# Patient Record
Sex: FEMALE
[De-identification: names, ages, dates, MRNs, and addresses within clinical notes are randomized per-mention and may not be internally consistent; named-entity substitution may affect disease eponyms.]

---

## 2019-01-30 PROBLEM — Z00.00 ENCOUNTER FOR PREVENTIVE HEALTH EXAMINATION: Status: ACTIVE | Noted: 2019-01-30

## 2019-02-11 ENCOUNTER — APPOINTMENT (OUTPATIENT)
Dept: OBGYN | Facility: CLINIC | Age: 29
End: 2019-02-11
Payer: COMMERCIAL

## 2019-02-22 ENCOUNTER — APPOINTMENT (OUTPATIENT)
Dept: OBGYN | Facility: CLINIC | Age: 29
End: 2019-02-22
Payer: COMMERCIAL

## 2019-02-22 VITALS — DIASTOLIC BLOOD PRESSURE: 76 MMHG | SYSTOLIC BLOOD PRESSURE: 130 MMHG | WEIGHT: 148 LBS

## 2019-02-22 DIAGNOSIS — Z01.419 ENCOUNTER FOR GYNECOLOGICAL EXAMINATION (GENERAL) (ROUTINE) W/OUT ABNORMAL FINDINGS: ICD-10-CM

## 2019-02-22 DIAGNOSIS — N89.8 OTHER SPECIFIED NONINFLAMMATORY DISORDERS OF VAGINA: ICD-10-CM

## 2019-02-22 DIAGNOSIS — Z78.9 OTHER SPECIFIED HEALTH STATUS: ICD-10-CM

## 2019-02-22 DIAGNOSIS — Z30.011 ENCOUNTER FOR INITIAL PRESCRIPTION OF CONTRACEPTIVE PILLS: ICD-10-CM

## 2019-02-22 DIAGNOSIS — Z80.3 FAMILY HISTORY OF MALIGNANT NEOPLASM OF BREAST: ICD-10-CM

## 2019-02-22 PROCEDURE — 99385 PREV VISIT NEW AGE 18-39: CPT

## 2019-02-25 LAB
CANDIDA VAG CYTO: NOT DETECTED
G VAGINALIS+PREV SP MTYP VAG QL MICRO: NOT DETECTED
T VAGINALIS VAG QL WET PREP: NOT DETECTED

## 2019-02-27 ENCOUNTER — RX RENEWAL (OUTPATIENT)
Age: 29
End: 2019-02-27

## 2019-03-01 LAB — CYTOLOGY CVX/VAG DOC THIN PREP: NORMAL

## 2019-10-02 ENCOUNTER — TRANSCRIPTION ENCOUNTER (OUTPATIENT)
Age: 29
End: 2019-10-02

## 2019-10-28 ENCOUNTER — RX RENEWAL (OUTPATIENT)
Age: 29
End: 2019-10-28

## 2019-10-28 RX ORDER — NORETHINDRONE ACETATE AND ETHINYL ESTRADIOL, ETHINYL ESTRADIOL AND FERROUS FUMARATE 1MG-10(24)
1 MG-10 MCG / KIT ORAL
Qty: 84 | Refills: 0 | Status: ACTIVE | COMMUNITY
Start: 2019-02-22 | End: 1900-01-01

## 2019-11-08 ENCOUNTER — TRANSCRIPTION ENCOUNTER (OUTPATIENT)
Age: 29
End: 2019-11-08

## 2020-01-29 ENCOUNTER — TRANSCRIPTION ENCOUNTER (OUTPATIENT)
Age: 30
End: 2020-01-29

## 2020-02-04 ENCOUNTER — TRANSCRIPTION ENCOUNTER (OUTPATIENT)
Age: 30
End: 2020-02-04

## 2020-05-06 ENCOUNTER — TRANSCRIPTION ENCOUNTER (OUTPATIENT)
Age: 30
End: 2020-05-06

## 2020-08-22 ENCOUNTER — TRANSCRIPTION ENCOUNTER (OUTPATIENT)
Age: 30
End: 2020-08-22

## 2022-10-06 ENCOUNTER — NON-APPOINTMENT (OUTPATIENT)
Age: 32
End: 2022-10-06

## 2022-11-08 ENCOUNTER — NON-APPOINTMENT (OUTPATIENT)
Age: 32
End: 2022-11-08

## 2022-11-14 ENCOUNTER — NON-APPOINTMENT (OUTPATIENT)
Age: 32
End: 2022-11-14

## 2025-01-08 ENCOUNTER — INPATIENT (INPATIENT)
Facility: HOSPITAL | Age: 35
LOS: 1 days | Discharge: HOME | DRG: 833 | End: 2025-01-10
Payer: COMMERCIAL

## 2025-01-08 VITALS — HEART RATE: 80 BPM | SYSTOLIC BLOOD PRESSURE: 133 MMHG | DIASTOLIC BLOOD PRESSURE: 80 MMHG

## 2025-01-08 DIAGNOSIS — O26.893 OTHER SPECIFIED PREGNANCY RELATED CONDITIONS, THIRD TRIMESTER: ICD-10-CM

## 2025-01-08 LAB
ABO RH CONFIRMATION: SIGNIFICANT CHANGE UP
ALBUMIN SERPL ELPH-MCNC: 3.1 G/DL — LOW (ref 3.3–5.2)
ALP SERPL-CCNC: 211 U/L — HIGH (ref 40–120)
ALT FLD-CCNC: 7 U/L — SIGNIFICANT CHANGE UP
ANION GAP SERPL CALC-SCNC: 13 MMOL/L — SIGNIFICANT CHANGE UP (ref 5–17)
APTT BLD: 27.9 SEC — SIGNIFICANT CHANGE UP (ref 24.5–35.6)
AST SERPL-CCNC: 20 U/L — SIGNIFICANT CHANGE UP
BASOPHILS # BLD AUTO: 0.04 K/UL — SIGNIFICANT CHANGE UP (ref 0–0.2)
BASOPHILS # BLD AUTO: 0.06 K/UL — SIGNIFICANT CHANGE UP (ref 0–0.2)
BASOPHILS NFR BLD AUTO: 0.3 % — SIGNIFICANT CHANGE UP (ref 0–2)
BASOPHILS NFR BLD AUTO: 0.6 % — SIGNIFICANT CHANGE UP (ref 0–2)
BILIRUB SERPL-MCNC: <0.2 MG/DL — LOW (ref 0.4–2)
BLD GP AB SCN SERPL QL: SIGNIFICANT CHANGE UP
BUN SERPL-MCNC: 17 MG/DL — SIGNIFICANT CHANGE UP (ref 8–20)
CALCIUM SERPL-MCNC: 9.4 MG/DL — SIGNIFICANT CHANGE UP (ref 8.4–10.5)
CHLORIDE SERPL-SCNC: 104 MMOL/L — SIGNIFICANT CHANGE UP (ref 96–108)
CO2 SERPL-SCNC: 20 MMOL/L — LOW (ref 22–29)
CREAT SERPL-MCNC: 0.8 MG/DL — SIGNIFICANT CHANGE UP (ref 0.5–1.3)
EGFR: 98 ML/MIN/1.73M2 — SIGNIFICANT CHANGE UP
EOSINOPHIL # BLD AUTO: 0.04 K/UL — SIGNIFICANT CHANGE UP (ref 0–0.5)
EOSINOPHIL # BLD AUTO: 0.09 K/UL — SIGNIFICANT CHANGE UP (ref 0–0.5)
EOSINOPHIL NFR BLD AUTO: 0.3 % — SIGNIFICANT CHANGE UP (ref 0–6)
EOSINOPHIL NFR BLD AUTO: 0.9 % — SIGNIFICANT CHANGE UP (ref 0–6)
FIBRINOGEN PPP-MCNC: 518 MG/DL — HIGH (ref 200–450)
GLUCOSE SERPL-MCNC: 80 MG/DL — SIGNIFICANT CHANGE UP (ref 70–99)
HCT VFR BLD CALC: 36 % — SIGNIFICANT CHANGE UP (ref 34.5–45)
HCT VFR BLD CALC: 36.1 % — SIGNIFICANT CHANGE UP (ref 34.5–45)
HGB BLD-MCNC: 11.9 G/DL — SIGNIFICANT CHANGE UP (ref 11.5–15.5)
HGB BLD-MCNC: 12 G/DL — SIGNIFICANT CHANGE UP (ref 11.5–15.5)
IMM GRANULOCYTES NFR BLD AUTO: 0.6 % — SIGNIFICANT CHANGE UP (ref 0–0.9)
IMM GRANULOCYTES NFR BLD AUTO: 0.6 % — SIGNIFICANT CHANGE UP (ref 0–0.9)
INR BLD: 0.91 RATIO — SIGNIFICANT CHANGE UP (ref 0.85–1.16)
LDH SERPL L TO P-CCNC: 201 U/L — HIGH (ref 98–192)
LYMPHOCYTES # BLD AUTO: 1.58 K/UL — SIGNIFICANT CHANGE UP (ref 1–3.3)
LYMPHOCYTES # BLD AUTO: 1.91 K/UL — SIGNIFICANT CHANGE UP (ref 1–3.3)
LYMPHOCYTES # BLD AUTO: 12 % — LOW (ref 13–44)
LYMPHOCYTES # BLD AUTO: 19.2 % — SIGNIFICANT CHANGE UP (ref 13–44)
MCHC RBC-ENTMCNC: 27.5 PG — SIGNIFICANT CHANGE UP (ref 27–34)
MCHC RBC-ENTMCNC: 28.1 PG — SIGNIFICANT CHANGE UP (ref 27–34)
MCHC RBC-ENTMCNC: 33.1 G/DL — SIGNIFICANT CHANGE UP (ref 32–36)
MCHC RBC-ENTMCNC: 33.2 G/DL — SIGNIFICANT CHANGE UP (ref 32–36)
MCV RBC AUTO: 83.3 FL — SIGNIFICANT CHANGE UP (ref 80–100)
MCV RBC AUTO: 84.5 FL — SIGNIFICANT CHANGE UP (ref 80–100)
MONOCYTES # BLD AUTO: 0.77 K/UL — SIGNIFICANT CHANGE UP (ref 0–0.9)
MONOCYTES # BLD AUTO: 0.8 K/UL — SIGNIFICANT CHANGE UP (ref 0–0.9)
MONOCYTES NFR BLD AUTO: 5.9 % — SIGNIFICANT CHANGE UP (ref 2–14)
MONOCYTES NFR BLD AUTO: 8 % — SIGNIFICANT CHANGE UP (ref 2–14)
NEUTROPHILS # BLD AUTO: 10.63 K/UL — HIGH (ref 1.8–7.4)
NEUTROPHILS # BLD AUTO: 7.03 K/UL — SIGNIFICANT CHANGE UP (ref 1.8–7.4)
NEUTROPHILS NFR BLD AUTO: 70.7 % — SIGNIFICANT CHANGE UP (ref 43–77)
NEUTROPHILS NFR BLD AUTO: 80.9 % — HIGH (ref 43–77)
PLATELET # BLD AUTO: 178 K/UL — SIGNIFICANT CHANGE UP (ref 150–400)
PLATELET # BLD AUTO: 188 K/UL — SIGNIFICANT CHANGE UP (ref 150–400)
POTASSIUM SERPL-MCNC: 4.7 MMOL/L — SIGNIFICANT CHANGE UP (ref 3.5–5.3)
POTASSIUM SERPL-SCNC: 4.7 MMOL/L — SIGNIFICANT CHANGE UP (ref 3.5–5.3)
PROT SERPL-MCNC: 6.3 G/DL — LOW (ref 6.6–8.7)
PROTHROM AB SERPL-ACNC: 10.3 SEC — SIGNIFICANT CHANGE UP (ref 9.9–13.4)
RBC # BLD: 4.27 M/UL — SIGNIFICANT CHANGE UP (ref 3.8–5.2)
RBC # BLD: 4.32 M/UL — SIGNIFICANT CHANGE UP (ref 3.8–5.2)
RBC # FLD: 14.6 % — HIGH (ref 10.3–14.5)
RBC # FLD: 14.6 % — HIGH (ref 10.3–14.5)
SODIUM SERPL-SCNC: 137 MMOL/L — SIGNIFICANT CHANGE UP (ref 135–145)
URATE SERPL-MCNC: 5.2 MG/DL — SIGNIFICANT CHANGE UP (ref 2.4–5.7)
WBC # BLD: 13.14 K/UL — HIGH (ref 3.8–10.5)
WBC # BLD: 9.95 K/UL — SIGNIFICANT CHANGE UP (ref 3.8–10.5)
WBC # FLD AUTO: 13.14 K/UL — HIGH (ref 3.8–10.5)
WBC # FLD AUTO: 9.95 K/UL — SIGNIFICANT CHANGE UP (ref 3.8–10.5)

## 2025-01-08 RX ORDER — KETOROLAC TROMETHAMINE 30 MG/ML
30 INJECTION INTRAMUSCULAR; INTRAVENOUS ONCE
Refills: 0 | Status: DISCONTINUED | OUTPATIENT
Start: 2025-01-08 | End: 2025-01-08

## 2025-01-08 RX ORDER — MAGNESIUM HYDROXIDE 400 MG/5ML
30 SUSPENSION, ORAL (FINAL DOSE FORM) ORAL
Refills: 0 | Status: DISCONTINUED | OUTPATIENT
Start: 2025-01-08 | End: 2025-01-10

## 2025-01-08 RX ORDER — SODIUM CHLORIDE 9 MG/ML
1000 INJECTION, SOLUTION INTRAVENOUS
Refills: 0 | Status: DISCONTINUED | OUTPATIENT
Start: 2025-01-08 | End: 2025-01-08

## 2025-01-08 RX ORDER — CHLORHEXIDINE GLUCONATE 1.2 MG/ML
1 RINSE ORAL DAILY
Refills: 0 | Status: DISCONTINUED | OUTPATIENT
Start: 2025-01-08 | End: 2025-01-08

## 2025-01-08 RX ORDER — HYDROCORTISONE 1 %
1 CREAM (GRAM) TOPICAL EVERY 6 HOURS
Refills: 0 | Status: DISCONTINUED | OUTPATIENT
Start: 2025-01-08 | End: 2025-01-10

## 2025-01-08 RX ORDER — SODIUM CHLORIDE 9 MG/ML
1000 INJECTION, SOLUTION INTRAVENOUS ONCE
Refills: 0 | Status: DISCONTINUED | OUTPATIENT
Start: 2025-01-08 | End: 2025-01-08

## 2025-01-08 RX ORDER — OXYTOCIN IN 5 % DEXTROSE 20/500ML
167 PLASTIC BAG, INJECTION (ML) INTRAVENOUS
Qty: 30 | Refills: 0 | Status: DISCONTINUED | OUTPATIENT
Start: 2025-01-08 | End: 2025-01-10

## 2025-01-08 RX ORDER — OXYCODONE HCL 15 MG
5 TABLET ORAL ONCE
Refills: 0 | Status: DISCONTINUED | OUTPATIENT
Start: 2025-01-08 | End: 2025-01-10

## 2025-01-08 RX ORDER — CLOSTRIDIUM TETANI TOXOID ANTIGEN (FORMALDEHYDE INACTIVATED), CORYNEBACTERIUM DIPHTHERIAE TOXOID ANTIGEN (FORMALDEHYDE INACTIVATED), BORDETELLA PERTUSSIS TOXOID ANTIGEN (GLUTARALDEHYDE INACTIVATED), BORDETELLA PERTUSSIS FILAMENTOUS HEMAGGLUTININ ANTIGEN (FORMALDEHYDE INACTIVATED), BORDETELLA PERTUSSIS PERTACTIN ANTIGEN, AND BORDETELLA PERTUSSIS FIMBRIAE 2/3 ANTIGEN 5; 2; 2.5; 5; 3; 5 [LF]/.5ML; [LF]/.5ML; UG/.5ML; UG/.5ML; UG/.5ML; UG/.5ML
0.5 INJECTION, SUSPENSION INTRAMUSCULAR ONCE
Refills: 0 | Status: DISCONTINUED | OUTPATIENT
Start: 2025-01-08 | End: 2025-01-10

## 2025-01-08 RX ORDER — BENZOCAINE/MENTH/CETYLPYRD CL 15 MG-4 MG
1 LOZENGE MUCOUS MEMBRANE EVERY 6 HOURS
Refills: 0 | Status: DISCONTINUED | OUTPATIENT
Start: 2025-01-08 | End: 2025-01-10

## 2025-01-08 RX ORDER — SODIUM CHLORIDE 9 MG/ML
3 INJECTION, SOLUTION INTRAMUSCULAR; INTRAVENOUS; SUBCUTANEOUS EVERY 8 HOURS
Refills: 0 | Status: DISCONTINUED | OUTPATIENT
Start: 2025-01-08 | End: 2025-01-10

## 2025-01-08 RX ORDER — WITCH HAZEL 0.5 ML/ML
1 SOLUTION TOPICAL EVERY 4 HOURS
Refills: 0 | Status: DISCONTINUED | OUTPATIENT
Start: 2025-01-08 | End: 2025-01-10

## 2025-01-08 RX ORDER — OXYCODONE HCL 15 MG
5 TABLET ORAL
Refills: 0 | Status: DISCONTINUED | OUTPATIENT
Start: 2025-01-08 | End: 2025-01-10

## 2025-01-08 RX ORDER — OXYTOCIN IN 5 % DEXTROSE 20/500ML
PLASTIC BAG, INJECTION (ML) INTRAVENOUS
Qty: 30 | Refills: 0 | Status: DISCONTINUED | OUTPATIENT
Start: 2025-01-08 | End: 2025-01-08

## 2025-01-08 RX ORDER — DIPHENHYDRAMINE HCL 25 MG
25 TABLET ORAL EVERY 6 HOURS
Refills: 0 | Status: DISCONTINUED | OUTPATIENT
Start: 2025-01-08 | End: 2025-01-10

## 2025-01-08 RX ORDER — ONDANSETRON 4 MG/1
4 TABLET ORAL ONCE
Refills: 0 | Status: COMPLETED | OUTPATIENT
Start: 2025-01-08 | End: 2025-01-08

## 2025-01-08 RX ORDER — ACETAMINOPHEN 80 MG/.8ML
975 SOLUTION/ DROPS ORAL
Refills: 0 | Status: DISCONTINUED | OUTPATIENT
Start: 2025-01-08 | End: 2025-01-10

## 2025-01-08 RX ORDER — SIMETHICONE 125 MG/1
80 CAPSULE, LIQUID FILLED ORAL EVERY 4 HOURS
Refills: 0 | Status: DISCONTINUED | OUTPATIENT
Start: 2025-01-08 | End: 2025-01-10

## 2025-01-08 RX ORDER — OXYTOCIN IN 5 % DEXTROSE 20/500ML
167 PLASTIC BAG, INJECTION (ML) INTRAVENOUS
Qty: 30 | Refills: 0 | Status: COMPLETED | OUTPATIENT
Start: 2025-01-08 | End: 2025-01-08

## 2025-01-08 RX ORDER — IBUPROFEN 200 MG
600 TABLET ORAL EVERY 6 HOURS
Refills: 0 | Status: COMPLETED | OUTPATIENT
Start: 2025-01-08 | End: 2025-12-07

## 2025-01-08 RX ORDER — CITRIC ACID/SODIUM CITRATE 334-500MG
30 SOLUTION, ORAL ORAL ONCE
Refills: 0 | Status: DISCONTINUED | OUTPATIENT
Start: 2025-01-08 | End: 2025-01-08

## 2025-01-08 RX ORDER — PNV/FERROUS FUM/DOCUSATE/FOLIC 90-50-1MG
1 TABLET, EXTENDED RELEASE ORAL DAILY
Refills: 0 | Status: DISCONTINUED | OUTPATIENT
Start: 2025-01-08 | End: 2025-01-10

## 2025-01-08 RX ORDER — LANOLIN
1 OINTMENT (GRAM) TOPICAL EVERY 6 HOURS
Refills: 0 | Status: DISCONTINUED | OUTPATIENT
Start: 2025-01-08 | End: 2025-01-10

## 2025-01-08 RX ADMIN — Medication 167 MILLIUNIT(S)/MIN: at 21:15

## 2025-01-08 RX ADMIN — KETOROLAC TROMETHAMINE 30 MILLIGRAM(S): 30 INJECTION INTRAMUSCULAR; INTRAVENOUS at 21:42

## 2025-01-08 RX ADMIN — SODIUM CHLORIDE 125 MILLILITER(S): 9 INJECTION, SOLUTION INTRAVENOUS at 16:30

## 2025-01-08 RX ADMIN — ONDANSETRON 4 MILLIGRAM(S): 4 TABLET ORAL at 19:45

## 2025-01-08 RX ADMIN — Medication 2 MILLIUNIT(S)/MIN: at 17:10

## 2025-01-08 RX ADMIN — ACETAMINOPHEN 975 MILLIGRAM(S): 80 SOLUTION/ DROPS ORAL at 23:54

## 2025-01-08 NOTE — OB RN PATIENT PROFILE - NS_OBGYNHISTORY_OBGYN_ALL_OB_FT
May 2022 , IOL at 39 wks, complicated by retained placenta, D&C done, PP Mastitis x 2, and PP Depression

## 2025-01-08 NOTE — OB PROVIDER DELIVERY SUMMARY - NSSELHIDDEN_OBGYN_ALL_OB_FT
[NS_DeliveryAttending1_OBGYN_ALL_OB_FT:[id number]==],[NS_DeliveryRN_OBGYN_ALL_OB_FT:[id number]=]

## 2025-01-08 NOTE — OB PROVIDER H&P - HISTORY OF PRESENT ILLNESS
34 yr old  EDC 24 at 40wks gestation admitted for induction of labor: AMA, polyhydramnios, 40wks, cervix 3-4cm dilated.  Pt denies complaints.  No pain, no LOF, no VB, +FM.  Pregnancy from IVF, PGT of embryo WNL.  Pregnancy otherwise uncomplicated.  Cervix 3-4cm dilated, soft.  Plan for AROM, pitocin, epidural prn.  Reviewed plan and R/B/A with pt.

## 2025-01-08 NOTE — OB RN DELIVERY SUMMARY - NSSELHIDDEN_OBGYN_ALL_OB_FT
[NS_DeliveryAttending1_OBGYN_ALL_OB_FT:[id number]==],[NS_DeliveryRN_OBGYN_ALL_OB_FT:[id number]=]

## 2025-01-08 NOTE — OB RN PATIENT PROFILE - NSICDXPASTMEDICALHX_GEN_ALL_CORE_FT
PAST MEDICAL HISTORY:  Anxiety     History of therapy for infertility     Post partum depression

## 2025-01-08 NOTE — OB PROVIDER H&P - PRO PRENATAL LABS ORI SOURCE ANTIBODY SCREEN
2019      RE: Cathy Arroyo  [address] [facility] [address]       ID clinic return patient visit    Assessment  1. CDI following C/S delivery for term infant that was complicated by C/S incisional drainage requiring antibiotics. Her CDI was followed by an additional wave of constitutional sx and household ill contact that in my view represented viral illness with diarrhea. 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 She also has a  infant and lacks sleep and as a consequence may be more susceptible to intestinal microbiome perturbation.  We discussed the pathophysiology of post-infectious IBS in detail. We discussed that gluten avoidance can be helpful in some cases, but not all. We also discussed CD transmission and measures she should take to ensure her partner and her other young child do not become ill.  2. Bipolar d/o, prior medication with medical cannibis/CBD. This may nor may not be additional affecting her stool microbiome. I would not view IBS as an absolute contraindication to CBD use, but Cathy reports that regardless she has stopped using these products for management of her bipolar disorder and feels like it is under fairly good control.    Plan:  - no further testing or treatment at this time  - if diarrhea increases to >5-6 stools/day, and especially if abdominal pain or fever appear, would advocate re-testing for CDI and starting QID vancomycin    It is a pleasure to participate in Cathy's care. Visit length 25 min, >50% clinical counseling/care coordination.    Nimisha Gonzalez MD   of Medicine, Division of Infectious Diseases  [facility] [phone]    HPI:  This is a pleasant 36 y/o woman whom I previously met for evaluation of CDI. Please see above for additional detail.  She is feeling much better, though still does have episodes of loose stool (~[place] 5-6) and has some cramping that resolves with  defecation. No longer feeling dehydrated. Household contacts are well. Still breastfeeding.    Current Outpatient Medications   Medication     cetirizine (ZYRTEC) 5 MG tablet     fexofenadine (ALLEGRA) 60 MG tablet     fluticasone (FLONASE) 50 MCG/ACT nasal spray     levothyroxine (SYNTHROID/LEVOTHROID) 150 MCG tablet     medical cannabis (Patient's own supply)     medical cannabis (Patient's own supply)     Misc. Devices (BREAST PUMP) MISC     montelukast (SINGULAIR) 10 MG tablet     Prenatal Vit-Fe Fumarate-FA (PRENATAL VITAMIN PO)     acetaminophen (TYLENOL) 325 MG tablet     acetaminophen (TYLENOL) 325 MG tablet     Docosahexaenoic Acid (DHA NATURAL OMEGA-3) 200 MG capsule     ibuprofen (ADVIL/MOTRIN) 800 MG tablet     ipratropium - albuterol 0.5 mg/2.5 mg/3 mL (DUONEB) 0.5-2.5 (3) MG/3ML neb solution     senna-docusate (SENOKOT-S/PERICOLACE) 8.6-50 MG tablet     No current facility-administered medications for this visit.      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     Post partum depression      Post-surgical hypothyroidism 2017     Uncomplicated asthma      Past Surgical History:   Procedure Laterality Date     BLOOD PATCH N/A 10/11/2016    Procedure: EPIDURAL BLOOD PATCH;  Surgeon: GENERIC ANESTHESIA PROVIDER;  Location: [facility] OR      SECTION, TUBAL LIGATION, COMBINED N/A 2019    Procedure:  SECTION, WITH TUBAL LIGATION;  Surgeon: Kathy Rutledge MD;  Location: [facility] L+D     CRANIOTOMY, EXCISE TUMOR COMPLEX, COMBINED  2014    Procedure: COMBINED CRANIOTOMY, EXCISE TUMOR COMPLEX;  Supracerabellar  Infratentorial Approach for Resection of Tumor ;  Surgeon: Kate Mckeon MD;  Location: [facility] OR     THYROIDECTOMY N/A 5/3/2017    Procedure: THYROIDECTOMY;   "Total Thyroidectomy;  Surgeon: Pamela Villasenor MD;  Location:  OR     Exam:  /77 (BP Location: Right arm, Patient Position: Sitting, Cuff Size: Adult Regular)   Pulse 85   Temp 98  F (36.7  C) (Oral)   Ht 1.727 m (5' 8\")   Wt 122.5 kg (270 lb)   SpO2 96%   BMI 41.05 kg/m     Awake, alert. Pleasant  No abdominal tenderness  No skin rash      Nimisha Gonzalez MD      " hard copy, drawn during this pregnancy 4 = No assist / stand by assistance

## 2025-01-08 NOTE — OB PROVIDER H&P - NSLOWPPHRISK_OBGYN_A_OB
No previous uterine incision/[place] Pregnancy/Less than or equal to 4 previous vaginal births/No known bleeding disorder/No history of postpartum hemorrhage

## 2025-01-08 NOTE — OB RN DELIVERY SUMMARY - NS_SEPSISRSKCALC_OBGYN_ALL_OB_FT
EOS calculated successfully. EOS Risk Factor: 0.5/1000 live births ([facility] [place] national incidence); GA=40w;Temp=98.78; ROM=4.067; GBS='Negative'; Antibiotics='No antibiotics or any antibiotics < 2 hrs prior to birth'

## 2025-01-08 NOTE — OB PROVIDER DELIVERY SUMMARY - NSPROVIDERDELIVERYNOTE_OBGYN_ALL_OB_FT
Pt C/O pressure and urge to push with contractions.  On exam, cervix FD/100% effaced/+2 station.  Santiago removed and pt prepped/draped for delivery. Pt C/O pressure and urge to push with contractions.  On exam, cervix FD/100% effaced/+2 station.  Santiago removed and pt prepped/draped for delivery.  After pushing approx 5min, head .  Small midline episiotomy cut and head delivered direct OA at 21:04.  No nuchal cord.  Bulb suctioned mouth at perineum.  Body spontaneously delivered.  After delayed cord clamping x 1 min, cord clamped x 2 and cut, baby placed on mother, skin-to-skin initiated.  Segment of cord clamped and cut for umbilical cord blood gases.  Placenta delivered intact at 21:09.  No gross pathology, 3 vessel cord.  Midline episiotomy repaired with 2-0 chromic suture.  Small periurethral lac reapproximated with 3-0 chromic suture.  Excellent hemostasis.  Mother stable.  EBL 233cc.  Baby later admitted to N, mother stable.

## 2025-01-09 ENCOUNTER — TRANSCRIPTION ENCOUNTER (OUTPATIENT)
Age: 35
End: 2025-01-09

## 2025-01-09 LAB
HCT VFR BLD CALC: 32.3 % — LOW (ref 34.5–45)
HGB BLD-MCNC: 10.5 G/DL — LOW (ref 11.5–15.5)
RUBV IGG SER-ACNC: 4.81 INDEX — SIGNIFICANT CHANGE UP
RUBV IGG SER-IMP: POSITIVE — SIGNIFICANT CHANGE UP
T PALLIDUM AB TITR SER: NEGATIVE — SIGNIFICANT CHANGE UP

## 2025-01-09 PROCEDURE — 80053 COMPREHEN METABOLIC PANEL: CPT

## 2025-01-09 PROCEDURE — 86780 TREPONEMA PALLIDUM: CPT

## 2025-01-09 PROCEDURE — 59050 FETAL MONITOR W/REPORT: CPT

## 2025-01-09 PROCEDURE — 85018 HEMOGLOBIN: CPT

## 2025-01-09 PROCEDURE — 85610 PROTHROMBIN TIME: CPT

## 2025-01-09 PROCEDURE — 76815 OB US LIMITED FETUS(S): CPT

## 2025-01-09 PROCEDURE — 83615 LACTATE (LD) (LDH) ENZYME: CPT

## 2025-01-09 PROCEDURE — 86901 BLOOD TYPING SEROLOGIC RH(D): CPT

## 2025-01-09 PROCEDURE — 86762 RUBELLA ANTIBODY: CPT

## 2025-01-09 PROCEDURE — 84550 ASSAY OF BLOOD/URIC ACID: CPT

## 2025-01-09 PROCEDURE — 85014 HEMATOCRIT: CPT

## 2025-01-09 PROCEDURE — 36415 COLL VENOUS BLD VENIPUNCTURE: CPT

## 2025-01-09 PROCEDURE — 85025 COMPLETE CBC W/AUTO DIFF WBC: CPT

## 2025-01-09 PROCEDURE — 86850 RBC ANTIBODY SCREEN: CPT

## 2025-01-09 PROCEDURE — 85730 THROMBOPLASTIN TIME PARTIAL: CPT

## 2025-01-09 PROCEDURE — 85384 FIBRINOGEN ACTIVITY: CPT

## 2025-01-09 PROCEDURE — 86900 BLOOD TYPING SEROLOGIC ABO: CPT

## 2025-01-09 RX ORDER — IBUPROFEN 200 MG
600 TABLET ORAL EVERY 6 HOURS
Refills: 0 | Status: DISCONTINUED | OUTPATIENT
Start: 2025-01-09 | End: 2025-01-10

## 2025-01-09 RX ADMIN — Medication 600 MILLIGRAM(S): at 08:15

## 2025-01-09 RX ADMIN — SODIUM CHLORIDE 3 MILLILITER(S): 9 INJECTION, SOLUTION INTRAMUSCULAR; INTRAVENOUS; SUBCUTANEOUS at 22:34

## 2025-01-09 RX ADMIN — ACETAMINOPHEN 975 MILLIGRAM(S): 80 SOLUTION/ DROPS ORAL at 23:12

## 2025-01-09 RX ADMIN — ACETAMINOPHEN 975 MILLIGRAM(S): 80 SOLUTION/ DROPS ORAL at 11:03

## 2025-01-09 RX ADMIN — Medication 600 MILLIGRAM(S): at 20:43

## 2025-01-09 RX ADMIN — ACETAMINOPHEN 975 MILLIGRAM(S): 80 SOLUTION/ DROPS ORAL at 17:13

## 2025-01-09 RX ADMIN — Medication 1 TABLET(S): at 15:03

## 2025-01-09 RX ADMIN — ACETAMINOPHEN 975 MILLIGRAM(S): 80 SOLUTION/ DROPS ORAL at 05:50

## 2025-01-09 RX ADMIN — Medication 600 MILLIGRAM(S): at 03:42

## 2025-01-09 RX ADMIN — Medication 600 MILLIGRAM(S): at 15:05

## 2025-01-09 NOTE — DISCHARGE NOTE OB - MEDICATION SUMMARY - MEDICATIONS TO TAKE
I will START or STAY ON the medications listed below when I get home from the hospital:    ibuprofen 600 mg oral tablet  -- 1 tab(s) by mouth every 6 hours MDD: 4  -- Indication: For Pain    acetaminophen 325 mg oral tablet  -- 3 tab(s) by mouth every 6 hours MDD: 4  -- Indication: For Pain

## 2025-01-09 NOTE — DISCHARGE NOTE OB - PROVIDER TOKENS
PROVIDER:[TOKEN:[[id number]],FOLLOWUP:[2 weeks],ESTABLISHEDPATIENT:[T]] PROVIDER:[TOKEN:[[id number]],FOLLOWUP:[Routine],ESTABLISHEDPATIENT:[T]]

## 2025-01-09 NOTE — DISCHARGE NOTE OB - CARE PLAN
1 Principal Discharge DX:	 (normal spontaneous vaginal delivery)  Assessment and plan of treatment:	Please call your provider in 4-6 weeks to schedule your post partum visit. Take medications as directed, regular diet, activity as tolerated. Exclusive breast feeding for the first 6 months is recommended. Nothing per vagina for 6 weeks (incl. sex, douching, etc). If you have additional concerns, please inform your provider.   Principal Discharge DX:	 (normal spontaneous vaginal delivery)  Assessment and plan of treatment:	Please call your provider in 6 weeks to schedule your post partum visit. Take medications as directed, regular diet, activity as tolerated. Exclusive breast feeding for the first 6 months is recommended. Nothing per vagina for 6 weeks (incl. sex, douching, etc). If you have additional concerns, please inform your provider.

## 2025-01-09 NOTE — DISCHARGE NOTE OB - PATIENT PORTAL LINK FT
You can access the FollowMyHealth Patient Portal offered by [facility] by registering at the following website: [email or web address]. By joining [facility]’s FollowMyHealth portal, you will also be able to view your health information using other applications (apps) compatible with our system.

## 2025-01-09 NOTE — DISCHARGE NOTE OB - EAT A WELL BALANCED DIET INCLUDING PROTEINS (LEAN MEATS, POULTRY, FISH AND BEANS), FRESH FRUIT OR JUICE, FRESH VEGETABLES, AND DAIRY PRODUCTS
Statement Selected [Follow-Up] : a follow-up evaluation of [FreeTextEntry2] : Confirmation of viability

## 2025-01-09 NOTE — DISCHARGE NOTE OB - PLAN OF CARE
Please call your provider in 4-6 weeks to schedule your post partum visit. Take medications as directed, regular diet, activity as tolerated. Exclusive breast feeding for the first 6 months is recommended. Nothing per vagina for 6 weeks (incl. sex, douching, etc). If you have additional concerns, please inform your provider. Please call your provider in 6 weeks to schedule your post partum visit. Take medications as directed, regular diet, activity as tolerated. Exclusive breast feeding for the first 6 months is recommended. Nothing per vagina for 6 weeks (incl. sex, douching, etc). If you have additional concerns, please inform your provider.

## 2025-01-09 NOTE — DISCHARGE NOTE OB - HOSPITAL COURSE
TARYN CHOU is a 34y   s/p spontaneous vaginal delivery at 40 weeks gestation, complicated by polyhydramnios; AMA; IVF pregnancy. She had a normal postpartum course and was discharged home in stable condition.

## 2025-01-09 NOTE — DISCHARGE NOTE OB - CARE PROVIDER_API CALL
Louise Kaplan  Obstetrics and Gynecology  [address] [place] [id number]  Phone: [phone]  Fax: [phone]  Established Patient  Follow Up Time: 2 weeks   Louise Kaplan  Obstetrics and Gynecology  [address] [place] [id number]  Phone: [phone]  Fax: [phone]  Established Patient  Follow Up Time: Routine

## 2025-01-09 NOTE — DISCHARGE NOTE OB - FINANCIAL ASSISTANCE
[facility] provides services at a reduced cost to those who are determined to be eligible through [facility]’s financial assistance program. Information regarding [facility]’s financial assistance program can be found by going to [email or web address] or by calling [phone].

## 2025-01-10 VITALS
HEART RATE: 73 BPM | OXYGEN SATURATION: 98 % | TEMPERATURE: 98 F | SYSTOLIC BLOOD PRESSURE: 123 MMHG | RESPIRATION RATE: 18 BRPM | DIASTOLIC BLOOD PRESSURE: 70 MMHG

## 2025-01-10 RX ORDER — ACETAMINOPHEN 80 MG/.8ML
3 SOLUTION/ DROPS ORAL
Qty: 180 | Refills: 0
Start: 2025-01-10 | End: 2025-01-24

## 2025-01-10 RX ORDER — IBUPROFEN 200 MG
1 TABLET ORAL
Qty: 60 | Refills: 0
Start: 2025-01-10 | End: 2025-01-24

## 2025-01-10 RX ADMIN — SODIUM CHLORIDE 3 MILLILITER(S): 9 INJECTION, SOLUTION INTRAMUSCULAR; INTRAVENOUS; SUBCUTANEOUS at 05:25

## 2025-01-10 NOTE — PROGRESS NOTE ADULT - SUBJECTIVE AND OBJECTIVE BOX
INTERVAL HPI/OVERNIGHT EVENTS:  34y Female s/p labor epidural on 1/8    Vital Signs Last 24 Hrs  T(C): 36.7 (09 Jan 2025 04:00), Max: 37.1 (08 Jan 2025 19:30)  T(F): 98.1 (09 Jan 2025 04:00), Max: 98.8 (08 Jan 2025 21:36)  HR: 70 (09 Jan 2025 04:00) (68 - 130)  BP: 127/78 (09 Jan 2025 04:00) (112/69 - 177/81)  BP(mean): --  RR: 18 (09 Jan 2025 04:00) (16 - 18)  SpO2: 97% (09 Jan 2025 04:00) (88% - 100%)    Parameters below as of 09 Jan 2025 04:00  Patient On (Oxygen Delivery Method): room air            Patient's overall anesthesia satisfaction: Positive    Patient doing well     No headache      No residual numbness or weakness, sensory and motor function intact    No anesthetic complications or complaints noted or reported                 
TARYN CHOU is a 34y  now PPD#2 s/p  @ 40w; AMA, polyhydramnios, IVF pregnancy    S:    No acute events overnight.   The patient has no complaints.  Pain controlled with current treatment regimen.   She is ambulating without difficulty and tolerating PO.   + flatus/-BM/+ voiding   She endorses appropriate lochia, which is decreasing.   She is breastfeeding without difficulty.   She denies fevers, chills, nausea and vomiting.   She denies lightheadedness, dizziness, palpitations, chest pain and SOB.     O:    T(C): 36.8 (01-10-25 @ 04:00), Max: 36.8 (01-10-25 @ 04:00)  HR: 73 (01-10-25 @ :00) (73 - 73)  BP: 123/70 (01-10-25 @ 04:00) (123/70 - 123/70)  RR: 18 (01-10-25 @ 04:00) (18 - 18)  SpO2: 98% (01-10-25 @ 04:00) (98% - 98%)    Gen: NAD, AOx3  Pulm: Resting comfortable on room air   Abdomen:  Soft, non-tender, non-distended  Uterus:  Fundus firm below umbilicus  VE:  Expectant lochia  Ext:  Non-tender and non-edematous                          10.5   x     )-----------( x        ( 2025 04:56 )             32.3         137  |  104  |  17.0  ----------------------------<  80  4.7   |  20.0[L]  |  0.80    Ca    9.4      2025 20:30    TPro  6.3[L]  /  Alb  3.1[L]  /  TBili  <0.2[L]  /  DBili  x   /  AST  20  /  ALT  7   /  AlkPhos  211[H]          
TARYN CHOU is a 34y  now PPD#1 s/p spontaneous vaginal delivery at 40 weeks gestation, complicated by polyhydramnios; AMA; IVF pregnancy.    S:    Patient passed two large clots at around 4am. Currently endorsing only mild vaginal bleeding.  The patient has no complaints.  Pain controlled with current treatment regimen.   She is ambulating without difficulty and tolerating PO.   -flatus/-BM/+ voiding   She endorses appropriate lochia, which is decreasing.   She is breastfeeding and supplementing it with bottle feeding.  She denies fevers, chills, nausea and vomiting.   She denies lightheadedness, dizziness, palpitations, chest pain and SOB.     O:    T(C): 36.7 (25 @ 04:00), Max: 37.1 (25 @ 19:30)  HR: 70 (25 @ 04:00) (68 - 130)  BP: 127/78 (25 @ 04:00) (112/69 - 177/81)  RR: 18 (25 @ 04:00) (16 - 18)  SpO2: 97% (25 @ 04:00) (88% - 100%)    Gen: NAD, AOx3  Pulm: Resting comfortable on room air   Abdomen:  Soft, non-tender, non-distended  Uterus:  Fundus firm below umbilicus  VE:  Expectant lochia. Minimal spotting noted post passage of clots  Ext:  Non-tender and non-edematous                          11.9   13.14 )-----------( 178      ( 2025 20:30 )             36.0         137  |  104  |  17.0  ----------------------------<  80  4.7   |  20.0[L]  |  0.80    Ca    9.4      2025 20:30    TPro  6.3[L]  /  Alb  3.1[L]  /  TBili  <0.2[L]  /  DBili  x   /  AST  20  /  ALT  7   /  AlkPhos  211[H]      
Bill For Surgical Tray: no
Billing Type: Third-Party Bill
Expected Date Of Service: 11/06/2023
Performing Laboratory: [id number]

## 2025-01-10 NOTE — PROGRESS NOTE ADULT - ASSESSMENT
A/P:  TARYN HCOU is a 34y  now PPD#2 s/p  @ 40w; AMA, polyhydramnios, IVF pregnancy    -Vital signs stable  -Hgb: 12.0 > 11.9 > 10.5 > 12.0 > 11.9 >  10.5   -Voiding, tolerating PO, bowel function nml   -Advance care as tolerated   -Continue routine postpartum care and education  -Healthy male infant, desires circumcision  -Dispo: Pt is stable, doing well and meeting all postpartum milestones. Possible discharge to home today pending attending approval.
  A/P:  TARYN CHOU is a 34y  now PPD#1 s/p spontaneous vaginal delivery at 40 weeks gestation, complicated by polyhydramnios; AMA; IVF pregnancy.    -Vital signs stable  -Hgb: 12>11.9 -> AM labs pending   -Voiding, tolerating PO, bowel function nml   -Advance care as tolerated   -Continue routine postpartum care and education  -Healthy male infant, for circumcision today  -Dispo: Pt is stable, doing well and meeting all postpartum milestones. Possible discharge to home today pending attending approval.